# Patient Record
Sex: FEMALE | Race: ASIAN
[De-identification: names, ages, dates, MRNs, and addresses within clinical notes are randomized per-mention and may not be internally consistent; named-entity substitution may affect disease eponyms.]

---

## 2020-06-25 ENCOUNTER — DASHBOARD ENCOUNTERS (OUTPATIENT)
Age: 45
End: 2020-06-25

## 2020-06-25 ENCOUNTER — OFFICE VISIT (OUTPATIENT)
Dept: URBAN - METROPOLITAN AREA CLINIC 96 | Facility: CLINIC | Age: 45
End: 2020-06-25
Payer: COMMERCIAL

## 2020-06-25 DIAGNOSIS — K60.2 ANAL FISSURE: ICD-10-CM

## 2020-06-25 DIAGNOSIS — L29.0 ANAL ITCHING: ICD-10-CM

## 2020-06-25 DIAGNOSIS — R19.7 DIARRHEA, UNSPECIFIED TYPE: ICD-10-CM

## 2020-06-25 PROCEDURE — 99204 OFFICE O/P NEW MOD 45 MIN: CPT | Performed by: INTERNAL MEDICINE

## 2020-06-25 PROCEDURE — 99244 OFF/OP CNSLTJ NEW/EST MOD 40: CPT | Performed by: INTERNAL MEDICINE

## 2020-06-25 NOTE — HPI-TODAY'S VISIT:
multiple complaints. anal itching/vaginal itching started last year. went to pcp who gave her diflucan. vaginal itching got better but then had anal itching. intermittent discomfort. intermittent diarrhea having perimenopausal sx went to see Dr. Thompson who put her on dilt and then NTG because things didnt improve. sometimes better sometimes worse comes and goes lots of severe nocturnal anal itching no abdominal pain

## 2020-07-05 LAB
CAMPYLOBACTER CULTURE: (no result)
E COLI SHIGA TOXIN EIA: NEGATIVE
Lab: (no result)
OVA + PARASITE EXAM: (no result)
SALMONELLA/SHIGELLA SCREEN: (no result)

## 2020-07-13 ENCOUNTER — OFFICE VISIT (OUTPATIENT)
Dept: URBAN - METROPOLITAN AREA TELEHEALTH 2 | Facility: TELEHEALTH | Age: 45
End: 2020-07-13
Payer: COMMERCIAL

## 2020-07-13 DIAGNOSIS — L29.0 ANAL ITCHING: ICD-10-CM

## 2020-07-13 DIAGNOSIS — R19.7 DIARRHEA, UNSPECIFIED TYPE: ICD-10-CM

## 2020-07-13 DIAGNOSIS — K60.1 CHRONIC ANAL FISSURE: ICD-10-CM

## 2020-07-13 DIAGNOSIS — K59.09 CHRONIC CONSTIPATION: ICD-10-CM

## 2020-07-13 PROCEDURE — G8427 DOCREV CUR MEDS BY ELIG CLIN: HCPCS | Performed by: INTERNAL MEDICINE

## 2020-07-13 PROCEDURE — 1036F TOBACCO NON-USER: CPT | Performed by: INTERNAL MEDICINE

## 2020-07-13 PROCEDURE — G9903 PT SCRN TBCO ID AS NON USER: HCPCS | Performed by: INTERNAL MEDICINE

## 2020-07-13 PROCEDURE — 99213 OFFICE O/P EST LOW 20 MIN: CPT | Performed by: INTERNAL MEDICINE

## 2020-07-13 PROCEDURE — G8420 CALC BMI NORM PARAMETERS: HCPCS | Performed by: INTERNAL MEDICINE

## 2020-07-13 NOTE — HPI-TODAY'S VISIT:
overall feeling so much better sx of constiaption get worse around day 16 of her cycle. itching is better. doing the NTG 0.4% daily.  no issues w/ fissure itching in perianal area    ====================================== multiple complaints. anal itching/vaginal itching started last year. went to pcp who gave her diflucan. vaginal itching got better but then had anal itching. intermittent discomfort. intermittent diarrhea having perimenopausal sx went to see Dr. Thompson who put her on dilt and then NTG because things didnt improve. sometimes better sometimes worse comes and goes lots of severe nocturnal anal itching no abdominal pain